# Patient Record
Sex: FEMALE | Race: WHITE
[De-identification: names, ages, dates, MRNs, and addresses within clinical notes are randomized per-mention and may not be internally consistent; named-entity substitution may affect disease eponyms.]

---

## 2019-04-29 ENCOUNTER — HOSPITAL ENCOUNTER (EMERGENCY)
Dept: HOSPITAL 46 - ED | Age: 79
Discharge: HOME | End: 2019-04-29
Payer: MEDICARE

## 2019-04-29 VITALS — BODY MASS INDEX: 23.05 KG/M2 | HEIGHT: 64 IN | WEIGHT: 134.99 LBS

## 2019-04-29 DIAGNOSIS — Z88.0: ICD-10-CM

## 2019-04-29 DIAGNOSIS — N18.3: ICD-10-CM

## 2019-04-29 DIAGNOSIS — Z79.82: ICD-10-CM

## 2019-04-29 DIAGNOSIS — I12.9: ICD-10-CM

## 2019-04-29 DIAGNOSIS — Z88.5: ICD-10-CM

## 2019-04-29 DIAGNOSIS — Z79.899: ICD-10-CM

## 2019-04-29 DIAGNOSIS — Z88.1: ICD-10-CM

## 2019-04-29 DIAGNOSIS — B02.9: Primary | ICD-10-CM

## 2022-01-22 NOTE — EKG
[facility]
                                    [address]
                                  [place], [place]  [address]
_________________________________________________________________________________________
                                                                 Signed   
 
 
Atrial fibrillation with rapid ventricular response
Minimal voltage criteria for LVH, may be normal variant ( Vaughn product )
Anterior infarct , age undetermined
ST \T\ T wave abnormality, consider lateral ischemia Abnormal ECG No previous ECGs
available
Confirmed by DANNA JULIAN MD ([id number]) on 1/22/2022 11:43:38 AM
 
 
 
 
 
 
 
 
 
 
 
 
 
 
 
 
 
 
 
 
 
 
 
 
 
 
 
 
 
 
 
 
 
 
 
 
 
 
 
    Electronically Signed By: DANNA JULIAN MD  01/22/22 1143
_________________________________________________________________________________________
PATIENT NAME:     TYSON CABRERA                   
MEDICAL RECORD #: [id number]                     Electrocardiogram             
          ACCT #: [id number]  
DATE OF BIRTH:   [birth year]                                       
PHYSICIAN:   DANNA JULIAN MD           REPORT #: [id number]
REPORT IS CONFIDENTIAL AND NOT TO BE RELEASED WITHOUT AUTHORIZATION